# Patient Record
Sex: MALE | Race: OTHER | Employment: FULL TIME | URBAN - METROPOLITAN AREA
[De-identification: names, ages, dates, MRNs, and addresses within clinical notes are randomized per-mention and may not be internally consistent; named-entity substitution may affect disease eponyms.]

---

## 2019-05-26 ENCOUNTER — HOSPITAL ENCOUNTER (OUTPATIENT)
Age: 64
Discharge: HOME | End: 2019-05-26
Attending: EMERGENCY MEDICINE

## 2019-05-26 VITALS
RESPIRATION RATE: 18 BRPM | TEMPERATURE: 101 F | HEIGHT: 63 IN | HEART RATE: 98 BPM | DIASTOLIC BLOOD PRESSURE: 74 MMHG | SYSTOLIC BLOOD PRESSURE: 174 MMHG | OXYGEN SATURATION: 96 % | WEIGHT: 163 LBS | BODY MASS INDEX: 28.88 KG/M2

## 2019-05-26 DIAGNOSIS — J18.9 COMMUNITY ACQUIRED PNEUMONIA OF RIGHT LOWER LOBE OF LUNG: Primary | ICD-10-CM

## 2019-05-26 PROCEDURE — 99203 OFFICE O/P NEW LOW 30 MIN: CPT

## 2019-05-26 PROCEDURE — 87430 STREP A AG IA: CPT

## 2019-05-26 PROCEDURE — 99204 OFFICE O/P NEW MOD 45 MIN: CPT

## 2019-05-26 RX ORDER — BENZONATATE 100 MG/1
100 CAPSULE ORAL 3 TIMES DAILY PRN
Qty: 30 CAPSULE | Refills: 0 | Status: SHIPPED | OUTPATIENT
Start: 2019-05-26 | End: 2019-06-25

## 2019-05-26 RX ORDER — FLUTICASONE PROPIONATE 50 MCG
2 SPRAY, SUSPENSION (ML) NASAL DAILY
Qty: 16 G | Refills: 0 | Status: SHIPPED | OUTPATIENT
Start: 2019-05-26 | End: 2019-06-25

## 2019-05-26 RX ORDER — ALBUTEROL SULFATE 90 UG/1
2 AEROSOL, METERED RESPIRATORY (INHALATION) EVERY 4 HOURS PRN
Qty: 1 INHALER | Refills: 0 | Status: SHIPPED | OUTPATIENT
Start: 2019-05-26 | End: 2019-06-25

## 2019-05-26 RX ORDER — METHYLPREDNISOLONE 4 MG/1
TABLET ORAL
Qty: 1 PACKAGE | Refills: 0 | Status: SHIPPED | OUTPATIENT
Start: 2019-05-26 | End: 2020-06-14

## 2019-05-26 RX ORDER — AZITHROMYCIN 250 MG/1
TABLET, FILM COATED ORAL
Qty: 1 PACKAGE | Refills: 0 | Status: SHIPPED | OUTPATIENT
Start: 2019-05-26 | End: 2020-06-14

## 2019-05-26 NOTE — ED PROVIDER NOTES
Patient Seen in: [facility] Immediate Care In [address] [place] [address]    History   Patient presents with:  Cough/URI    Stated Complaint: cough, congestion, fever, sore throat    HPI    Patient here with cough, congestion for 7 days.   No travel, no known sick co Resp 18   Temp (!) 100.8 °F (38.2 °C)   Temp src Temporal   SpO2 96 %   O2 Device None (Room air)       Current:BP (!) 174/74   Pulse 98   Temp (!) 100.8 °F (38.2 °C) (Temporal)   Resp 18   Ht 160 cm (5' 3\")   Wt 73.9 kg   SpO2 96%   BMI 28.87 kg/m²   P Nasal route daily.   Qty: 16 g Refills: 0    methylPREDNISolone 4 MG Oral Tablet Therapy Pack  Dosepack: use as directed on packaging  Qty: 1 Package Refills: 0    Saline (AYR NASAL MIST ALLERGY/SINUS) 2.65 % Nasal Solution  1 spray by Nasal route 3 (three)

## 2020-02-29 ENCOUNTER — HOSPITAL ENCOUNTER (OUTPATIENT)
Age: 65
Discharge: HOME | End: 2020-02-29
Attending: EMERGENCY MEDICINE

## 2020-02-29 VITALS
RESPIRATION RATE: 18 BRPM | BODY MASS INDEX: 28.7 KG/M2 | TEMPERATURE: 98 F | HEIGHT: 63 IN | HEART RATE: 101 BPM | SYSTOLIC BLOOD PRESSURE: 142 MMHG | DIASTOLIC BLOOD PRESSURE: 51 MMHG | WEIGHT: 162 LBS | OXYGEN SATURATION: 95 %

## 2020-02-29 DIAGNOSIS — R05.9 COUGH: Primary | ICD-10-CM

## 2020-02-29 PROCEDURE — 99213 OFFICE O/P EST LOW 20 MIN: CPT

## 2020-02-29 PROCEDURE — 99214 OFFICE O/P EST MOD 30 MIN: CPT

## 2020-02-29 RX ORDER — OSELTAMIVIR PHOSPHATE 75 MG/1
75 CAPSULE ORAL 2 TIMES DAILY
Qty: 10 CAPSULE | Refills: 0 | Status: SHIPPED | OUTPATIENT
Start: 2020-02-29 | End: 2020-03-05

## 2020-02-29 RX ORDER — AZITHROMYCIN 250 MG/1
TABLET, FILM COATED ORAL
Qty: 1 PACKAGE | Refills: 0 | Status: SHIPPED | OUTPATIENT
Start: 2020-02-29 | End: 2020-03-05

## 2020-02-29 NOTE — ED PROVIDER NOTES
Patient Seen in: [facility] Immediate Care In [address] [place] [address]      History   Patient presents with:  Sore Throat    Stated Complaint: sore throat, congestion, chills    HPI    Patient states since yesterday he has had fever chills cough and sore throat. empiric treatment and defer on any testing.   Will place on Zithromax which the patient has been prescribed previously for treatment of possible strep pharyngitis and pneumonia and will also prescribe Tamiflu              Disposition and Plan     Clinical I

## 2020-04-17 ENCOUNTER — HOSPITAL ENCOUNTER (OUTPATIENT)
Age: 65
Discharge: STILL ADMITTED OR ADMINISTRATIVE | End: 2020-04-17

## 2020-06-14 ENCOUNTER — APPOINTMENT (OUTPATIENT)
Dept: GENERAL RADIOLOGY | Age: 65
End: 2020-06-14
Attending: EMERGENCY MEDICINE
Payer: OTHER MISCELLANEOUS

## 2020-06-14 ENCOUNTER — HOSPITAL ENCOUNTER (OUTPATIENT)
Age: 65
Discharge: HOME | End: 2020-06-14
Attending: EMERGENCY MEDICINE
Payer: OTHER MISCELLANEOUS

## 2020-06-14 VITALS
TEMPERATURE: 99 F | BODY MASS INDEX: 28.7 KG/M2 | HEIGHT: 63 IN | HEART RATE: 102 BPM | WEIGHT: 162 LBS | SYSTOLIC BLOOD PRESSURE: 131 MMHG | RESPIRATION RATE: 22 BRPM | OXYGEN SATURATION: 99 % | DIASTOLIC BLOOD PRESSURE: 63 MMHG

## 2020-06-14 DIAGNOSIS — S80.02XA CONTUSION OF LEFT KNEE, INITIAL ENCOUNTER: Primary | ICD-10-CM

## 2020-06-14 DIAGNOSIS — S20.211A RIB CONTUSION, RIGHT, INITIAL ENCOUNTER: ICD-10-CM

## 2020-06-14 PROCEDURE — 73560 X-RAY EXAM OF KNEE 1 OR 2: CPT | Performed by: EMERGENCY MEDICINE

## 2020-06-14 PROCEDURE — 99214 OFFICE O/P EST MOD 30 MIN: CPT

## 2020-06-14 PROCEDURE — 71101 X-RAY EXAM UNILAT RIBS/CHEST: CPT | Performed by: EMERGENCY MEDICINE

## 2020-06-14 RX ORDER — NAPROXEN 250 MG/1
250 TABLET ORAL 2 TIMES DAILY WITH MEALS
Qty: 20 TABLET | Refills: 0 | Status: SHIPPED | OUTPATIENT
Start: 2020-06-14 | End: 2020-06-24

## 2020-06-14 NOTE — ED INITIAL ASSESSMENT (HPI)
FELL AT WORK LAST NIGHT INJURED LEFT KNEE AND RT ANT CHEST WITH SWELLING AND BRUISING USES A CANE TO WALK TODAY. APPEARS SLIGHTLY WINDED.

## 2020-06-14 NOTE — ED NOTES
[facility] pcp numbers given go to the ed for new or worse concern

## 2020-06-14 NOTE — ED PROVIDER NOTES
Patient Seen in: [facility] Immediate Care In [facility]    History   Patient presents with:  Knee Pain  Chest Pain    Stated Complaint: lt knee and chest pain    HPI    Patient complains of mechanical fall that occurred yesterday, patient fell at w lesions  NECK: supple, no midline tenderness, no pain with axial load, ROM intact no spinal tenderness  LUNGS: no resp distress, cta bilateral, tendernes to right chest on palpation.    CARDIO: RRR without murmur  GI: abdomen is soft and non tender, no mass as soon as possible for a visit in 3 days  For re-check    Karla Farley MD  [address] [place] [address]  [phone]    Schedule an appointment as soon as possible for a visit in 3 days  For re-check          Medications Prescribed:  Baudilio Edgar

## (undated) NOTE — LETTER
Date & Time: 6/14/2020, 2:18 PM  Patient: Arleen Escoto  Encounter Provider(s):    Ifeoma Ramires MD       To Whom It May Concern:    Arleen Escoto was seen and treated in our department on 6/14/2020.  He can return to work with these limitations: Lynne Martinez